# Patient Record
Sex: FEMALE | Race: WHITE | Employment: OTHER | URBAN - METROPOLITAN AREA
[De-identification: names, ages, dates, MRNs, and addresses within clinical notes are randomized per-mention and may not be internally consistent; named-entity substitution may affect disease eponyms.]

---
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
## 2017-09-11 ENCOUNTER — OFFICE VISIT (OUTPATIENT)
Dept: NEUROLOGY | Facility: CLINIC | Age: 82
End: 2017-09-11
Payer: COMMERCIAL

## 2017-09-11 VITALS
OXYGEN SATURATION: 95 % | TEMPERATURE: 97.1 F | BODY MASS INDEX: 21.46 KG/M2 | WEIGHT: 109.9 LBS | SYSTOLIC BLOOD PRESSURE: 115 MMHG | DIASTOLIC BLOOD PRESSURE: 71 MMHG

## 2017-09-11 DIAGNOSIS — G20.A1 PARKINSONS DISEASE (H): Primary | ICD-10-CM

## 2017-09-11 DIAGNOSIS — G20.A1 PARKINSON DISEASE (H): ICD-10-CM

## 2017-09-11 DIAGNOSIS — G56.03 BILATERAL CARPAL TUNNEL SYNDROME: ICD-10-CM

## 2017-09-11 PROBLEM — S62.102D FRACTURE OF LEFT WRIST WITH ROUTINE HEALING: Status: ACTIVE | Noted: 2017-05-02

## 2017-09-11 PROBLEM — S61.402A OPEN WOUND OF LEFT HAND: Status: ACTIVE | Noted: 2017-05-02

## 2017-09-11 PROBLEM — Z78.9 IMPAIRED MOBILITY AND ADLS: Status: ACTIVE | Noted: 2017-05-02

## 2017-09-11 PROBLEM — S70.02XA HEMATOMA OF LEFT HIP: Status: ACTIVE | Noted: 2017-05-02

## 2017-09-11 PROBLEM — Z74.09 IMPAIRED MOBILITY AND ADLS: Status: ACTIVE | Noted: 2017-05-02

## 2017-09-11 PROBLEM — D62 ACUTE BLOOD LOSS ANEMIA: Status: ACTIVE | Noted: 2017-05-02

## 2017-09-11 PROBLEM — R13.10 DYSPHAGIA: Status: ACTIVE | Noted: 2017-05-02

## 2017-09-11 PROBLEM — M81.0 AGE-RELATED OSTEOPOROSIS WITHOUT CURRENT PATHOLOGICAL FRACTURE: Status: ACTIVE | Noted: 2017-05-16

## 2017-09-11 PROCEDURE — 99214 OFFICE O/P EST MOD 30 MIN: CPT | Performed by: PSYCHIATRY & NEUROLOGY

## 2017-09-11 RX ORDER — CARBIDOPA/LEVODOPA 25MG-250MG
TABLET ORAL
Qty: 630 TABLET | Refills: 3 | Status: SHIPPED | OUTPATIENT
Start: 2017-09-11 | End: 2018-09-21

## 2017-09-11 RX ORDER — MAGNESIUM CARB/ALUMINUM HYDROX 105-160MG
1 TABLET,CHEWABLE ORAL DAILY PRN
COMMUNITY
Start: 2017-05-02

## 2017-09-11 RX ORDER — SENNOSIDES A AND B 8.6 MG/1
TABLET, FILM COATED ORAL
Qty: 120 TABLET | COMMUNITY
Start: 2017-09-11 | End: 2018-03-12

## 2017-09-11 RX ORDER — POLYETHYLENE GLYCOL 3350 17 G/17G
17 POWDER, FOR SOLUTION ORAL
Qty: 119 G | COMMUNITY
Start: 2017-09-11 | End: 2018-10-08

## 2017-09-11 RX ORDER — GABAPENTIN 100 MG/1
CAPSULE ORAL
Qty: 450 CAPSULE | Refills: 3 | Status: SHIPPED | OUTPATIENT
Start: 2017-09-11 | End: 2018-09-12

## 2017-09-11 RX ORDER — BISACODYL 10 MG
SUPPOSITORY, RECTAL RECTAL
COMMUNITY
Start: 2014-02-21

## 2017-09-11 RX ORDER — LACTOSE-REDUCED FOOD
1 LIQUID (ML) ORAL DAILY
COMMUNITY
Start: 2017-09-11

## 2017-09-11 RX ORDER — LACTOSE-REDUCED FOOD
LIQUID (ML) ORAL
COMMUNITY
Start: 2017-05-16 | End: 2018-03-12

## 2017-09-11 RX ORDER — POLYETHYLENE GLYCOL 3350 17 G/17G
17 POWDER, FOR SOLUTION ORAL
COMMUNITY
Start: 2017-08-28 | End: 2017-09-11

## 2017-09-11 RX ORDER — ACETAMINOPHEN 500 MG
TABLET ORAL
COMMUNITY
Start: 2017-05-09 | End: 2020-05-26

## 2017-09-11 RX ORDER — CITALOPRAM HYDROBROMIDE 20 MG/1
20 TABLET ORAL DAILY
Qty: 90 TABLET | Refills: 3 | Status: SHIPPED | OUTPATIENT
Start: 2017-09-11 | End: 2018-10-08

## 2017-09-11 RX ORDER — CARBIDOPA AND LEVODOPA 50; 200 MG/1; MG/1
TABLET, EXTENDED RELEASE ORAL
Qty: 90 TABLET | Refills: 11 | Status: SHIPPED | OUTPATIENT
Start: 2017-09-11 | End: 2018-09-26

## 2017-09-11 ASSESSMENT — PAIN SCALES - GENERAL: PAINLEVEL: NO PAIN (0)

## 2017-09-11 NOTE — MR AVS SNAPSHOT
After Visit Summary   2017    Avril Ram    MRN: [id number]           Patient Information     Date Of Birth          1925        Visit Information        Provider Department      2017 8:30 AM Kyle Valadez MD [facility]        Today's Diagnoses     Parkinsons disease (H)    -  1    Parkinson disease (H)        Bilateral carpal tunnel syndrome          Care Instructions    Diagnosis/Summary/Recommendations:    PATIENT: Avril Ram  89 year old female     : 1925    BINH: 2017    Parkinson    Issues addressed by PCP  1. Chronic bilateral low back pain, with sciatica presence unspecified (HRC)   2. Parkinson disease (HRC)   3. History of recent fall   4. Age-related osteoporosis without current pathological fracture (HRC)   5. Constipation, unspecified constipation type   6. Loss of weight   7. Urinary tract infection, site unspecified   8. Numbness of fingers of both hands     She states that she has had burning of her left middle finger - she has not had an emg.  She does not know if it wakes her up but it is worse in the morning. She has not had a wrist splint.    She has headaches    She has occasional hallucinations.    calcium  norco  Sinemet CR  Sinemet 25/250  Gabapentin  Citalopram  Diclofenac cream  xalatan  Senokot  voltaren gel  lorazepem  omeprzole  Albuterol  Ondansetron  Timolol  fluticasone    Over 50% of this visit was spent in patient care and care coordination.     History obtained from patient    Total visit time was 25 minutes    HAND Symptoms  1. Consider EMG  2. Consider wrist splint and OT evaluation    JAW symptoms  Consider videotaping with possible botox injection for jaw tremor vs jaw dyskinesia    Voice symptoms  Needs to work on her exercises  May consider harmonica or using an exhalation/incentive spirometry to strengthen her voice      Kyle Valadez MD           Follow-ups after your visit        Additional  Services     OCCUPATIONAL THERAPY REFERRAL                 Follow-up notes from your care team     Return in about 6 months (around 3/11/2018).      Your next 10 appointments already scheduled     Mar 12, 2018 11:00 AM CDT   Return Visit with Kyle Valadez MD   [facility] ([facility]    [id number] [place] [id number] [phone]              Future tests that were ordered for you today     Open Future Orders        Priority Expected Expires Ordered    EMG Routine  9/11/2018 9/11/2017            Who to contact     If you have questions or need follow up information about today's clinic visit or your schedule please contact [facility] directly at [phone].  Normal or non-critical lab and imaging results will be communicated to you by Loyalishart, letter or phone within 4 business days after the clinic has received the results. If you do not hear from us within 7 days, please contact the clinic through WorldWingert or phone. If you have a critical or abnormal lab result, we will notify you by phone as soon as possible.  Submit refill requests through AVIcode or call your pharmacy and they will forward the refill request to us. Please allow 3 business days for your refill to be completed.          Additional Information About Your Visit        AVIcode Information     AVIcode gives you secure access to your electronic health record. If you see a primary care provider, you can also send messages to your care team and make appointments. If you have questions, please call your primary care clinic.  If you do not have a primary care provider, please call [phone] and they will assist you.      AVIcode is an electronic gateway that provides easy, online access to your medical records. With AVIcode, you can request a clinic appointment, read your test results, renew a prescription or communicate with your care team.     To access your existing  account, please contact your [facility] or call [phone] for assistance.        Care EveryWhere ID     This is your Care EveryWhere ID. This could be used by other organizations to access your [place] medical records  [id number]        Your Vitals Were     Temperature Pulse Oximetry BMI (Body Mass Index)             97.1  F (36.2  C) (Oral) 95% 21.46 kg/m2          Blood Pressure from Last 3 Encounters:   09/11/17 115/71   11/14/16 160/71   05/09/16 116/69    Weight from Last 3 Encounters:   09/11/17 49.9 kg (109 lb 14.4 oz)   11/14/16 48.1 kg (106 lb)   05/09/16 44.5 kg (98 lb 3.2 oz)              We Performed the Following     OCCUPATIONAL THERAPY REFERRAL     WRIST SPLINT          Today's Medication Changes          These changes are accurate as of: 9/11/17  9:13 AM.  If you have any questions, ask your nurse or doctor.               These medicines have changed or have updated prescriptions.        Dose/Directions    polyethylene glycol powder   Commonly known as:  MIRALAX/GLYCOLAX   This may have changed:  when to take this   Changed by:  Kyle Valadez MD        Dose:  17 g   Take 17 g by mouth every other day   Quantity:  119 g   Refills:  0       senna 8.6 MG tablet   Commonly known as:  SENOKOT   This may have changed:  additional instructions   Changed by:  Kyle Valadez MD        Not taking as it makes her loose.   Quantity:  120 tablet   Refills:  0            Where to get your medicines      These medications were sent to [address]  [address] [facility] [address]     Phone:  [phone]     carbidopa-levodopa  MG per tablet    carbidopa-levodopa  MG per CR tablet    citalopram 20 MG tablet    gabapentin 100 MG capsule                Primary Care Provider Office Phone # Fax #    Shawna Lopez [phone]       [facility] [facility] [address]  [place] [address]        Equal Access to Services     [facility]: Hadii solo ku sylvia Romero, waaxda luqadaha, qaybta kaalmada maryharmanaidee, waxmoe farzad gracerudydagoberto benson. So [facility] [phone].    ATENCIÓN: Si habla español, tiene a tong disposición servicios gratuitos de asistencia lingüística. Fredame al [phone].    We comply with applicable federal civil rights laws and [place] laws. We do not discriminate on the basis of race, color, national origin, age, disability sex, sexual orientation or gender identity.            Thank you!     Thank you for choosing [facility]  for your care. Our goal is always to provide you with excellent care. Hearing back from our patients is one way we can continue to improve our services. Please take a few minutes to complete the written survey that you may receive in the mail after your visit with us. Thank you!             Your Updated Medication List - Protect others around you: Learn how to safely use, store and throw away your medicines at www.disposemymeds.org.          This list is accurate as of: 9/11/17  9:13 AM.  Always use your most recent med list.                   Brand Name Dispense Instructions for use Diagnosis    acetaminophen 500 MG tablet    TYLENOL     1000 mg tid and 500 mg bid prn. Maximum acetaminophen dose is 4000 mg in 24 hours  Indications: Pain        CALCIUM-VITAMIN D PO      Take 1 capsule by mouth 2 times daily        * carbidopa-levodopa  MG per tablet    SINEMET    630 tablet    1 tablet at 6a, 830a, 11a, 130pm, 4pm and 630pm   == 6-7 tablets per day    Parkinsons disease (H)       * carbidopa-levodopa  MG per CR tablet    SINEMET CR    90 tablet    Take one at  930pm    Parkinson disease (H)       citalopram 20 MG tablet    celeXA    90 tablet    Take 1 tablet (20 mg) by mouth daily    Parkinsons disease (H)       * ENSURE Liqd      Or equivalent nutritional supplement for weight loss daily        * BOOST       Take 1 Bottle by mouth daily        FLOVENT DISKUS 100 MCG/BLIST Aepb   Generic drug:  fluticasone      Inhale 1 puff into the lungs every 12 hours        gabapentin 100 MG capsule    NEURONTIN    450 capsule    1 tab @ 8am, 1 tab @ 4pm and 3 tabs @ 8pm   == 5 per day    Parkinsons disease (H)       LAXATIVE 10 MG Suppository   Generic drug:  bisacodyl      Unwrap and insert 1 Suppository rectally once a day as needed for Constipation.        LORazepam 0.5 MG tablet    ATIVAN    30 tablet    1 tablet at bedtime for primary insomnia    Primary insomnia       mineral oil oil      Place 1 drop in ear(s)        NONFORMULARY      Prune juice every other day        omeprazole 20 MG tablet     180 tablet    1 tab twice daily. And may see gi specialist.    Gastroesophageal reflux disease without esophagitis       polyethylene glycol powder    MIRALAX/GLYCOLAX    119 g    Take 17 g by mouth every other day        senna 8.6 MG tablet    SENOKOT    120 tablet    Not taking as it makes her loose.        timolol hemihydrate 0.5 % Soln ophthalmic solution    BETIMOL     Place 1 drop into both eyes 2 times daily.        XALATAN 0.005 % ophthalmic solution   Generic drug:  latanoprost     1 Bottle    Place 1 drop into both eyes At Bedtime        ZOFRAN 4 MG tablet   Generic drug:  ondansetron      Take  by mouth every 8 hours as needed.        * Notice:  This list has 4 medication(s) that are the same as other medications prescribed for you. Read the directions carefully, and ask your doctor or other care provider to review them with you.

## 2017-09-11 NOTE — NURSING NOTE
Avril Ram's goals for this visit include: recheck  She requests these members of her care team be copied on today's visit information:     PCP: Nayely Arana    Referring Provider:  ESTABLISHED PATIENT  No address on file    Chief Complaint   Patient presents with     RECHECK       Initial /71  Temp 97.1  F (36.2  C) (Oral)  Wt 49.9 kg (109 lb 14.4 oz)  SpO2 95%  BMI 21.46 kg/m2 Estimated body mass index is 21.46 kg/(m^2) as calculated from the following:    Height as of 11/4/13: 1.524 m (5').    Weight as of this encounter: 49.9 kg (109 lb 14.4 oz).  Medication Reconciliation: complete
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
## 2017-09-11 NOTE — PATIENT INSTRUCTIONS
Diagnosis/Summary/Recommendations:    PATIENT: Avril Ram  89 year old female     : 1925    BINH: 2017    Parkinson    Issues addressed by PCP  1. Chronic bilateral low back pain, with sciatica presence unspecified (HRC)   2. Parkinson disease (HRC)   3. History of recent fall   4. Age-related osteoporosis without current pathological fracture (HRC)   5. Constipation, unspecified constipation type   6. Loss of weight   7. Urinary tract infection, site unspecified   8. Numbness of fingers of both hands     She states that she has had burning of her left middle finger - she has not had an emg.  She does not know if it wakes her up but it is worse in the morning. She has not had a wrist splint.    She has headaches    She has occasional hallucinations.    calcium  norco  Sinemet CR  Sinemet 25/250  Gabapentin  Citalopram  Diclofenac cream  xalatan  Senokot  voltaren gel  lorazepem  omeprzole  Albuterol  Ondansetron  Timolol  fluticasone    Over 50% of this visit was spent in patient care and care coordination.     History obtained from patient    Total visit time was 25 minutes    HAND Symptoms  1. Consider EMG  2. Consider wrist splint and OT evaluation    JAW symptoms  Consider videotaping with possible botox injection for jaw tremor vs jaw dyskinesia    Voice symptoms  Needs to work on her exercises  May consider harmonica or using an exhalation/incentive spirometry to strengthen her voice      Kyle Valadez MD

## 2017-09-25 ENCOUNTER — TELEPHONE (OUTPATIENT)
Dept: NEUROLOGY | Facility: CLINIC | Age: 82
End: 2017-09-25

## 2017-09-25 NOTE — TELEPHONE ENCOUNTER
[facility] CLINICAL DOCUMENTATION    Form Documentation Form or Letter Request    Type or form/letter needing completion: Speech therapy  Provider: Rory  Has provider seen patient for office visit related to reason for form request? Yes  Date form needed: ASAP  Once completed: Fax form to: [phone]     This was completed and confirmation recvd    Darla Severin-Brown, LPN

## 2018-02-20 ENCOUNTER — TELEPHONE (OUTPATIENT)
Dept: NEUROLOGY | Facility: CLINIC | Age: 83
End: 2018-02-20

## 2018-02-20 NOTE — LETTER
" To: Imelda Colbert      In regards to your inquiry- see the information below. We tried to call the cell phone number listed but it stated \"the number you have dialed is not in service\". Feel free to give us a call at the clinic [phone].      At this time there is no brain bank for Parkinson's Disease at the [facility]. We have cooperated with both [place] and [place] in the past, and have a [place] alum recently join the [employer] who is interested in establishing a program here in the future.  Here are three resources to explore:     1) [facility]: http://www.[place].[facility]/research/labs/parkinsons-disease-clinical-genetics/brain-donor-program-information     https://www.brainsupportnetwork.org/brain-donation/  (a larger network that works with [facility])       2) Brain Donation Program [place]: [email or web address].[place].[facility]/participate-in-research/brain-donation-program/       3) [facility]:   The body bequest program can be found at : [email or web address]://www.med.[facility].edu/research/anatomy-bequest-program/what-whole-body-donation         Lorena Lira, RN Care Coordinator  [facility] Neurology  [phone]    "

## 2018-02-20 NOTE — TELEPHONE ENCOUNTER
"Attempted to call Imelda back multiple times but number listed stated \"number you have dialed is not in service\"; emailed Imelda information and mailed letter to pt's listed address addressed to Imelda Colbert.   "

## 2018-02-20 NOTE — TELEPHONE ENCOUNTER
[facility] Call Center    Phone Message    May a detailed message be left on voicemail: yes    Reason for Call: Other: Patients daughter is calling requesting to speak with clinic regarding the patient wanting to donate her brain to parkinsons research when the patient passes away. Patient and daughter were directed to speak with Dr. Valadez to see if he knows of any place where they could donate. Patient only wants to donate brain for research. Please advise.       Action Taken: Message routed to:  Adult Clinics: Neurology p [phone]

## 2018-02-20 NOTE — TELEPHONE ENCOUNTER
Jocelyn Amaral, SANDRA   You; Kyle Valadez MD; Torie Morrison RN 15 minutes ago (9:12 AM)                 Jayce Carrillo,     This is the message I put together after inquiring about this a few weeks ago:     At this time there is no brain bank for Parkinson's Disease at the [facility]. We have cooperated with both [place] and [place] in the past, and have a [place] alum recently join the [employer]  who is interested in establishing a program here in the future.  Here are three resources to explore:     1) [facility]: http://www.[place].[facility]/research/labs/parkinsons-disease-clinical-genetics/brain-donor-program-information     https://www.brainsupportnetwork.org/brain-donation/  (a larger network that works with [facility])       2) Brain Donation Program [place]: https://udallcenter.[place].[facility]/participate-in-research/brain-donation-program/       3) [facility]:   The body bequest program can be found at : https://www.med.[facility].edu/research/anatomy-bequest-program/what-whole-body-donation     Jocelyn Eisenberg  (Routing comment)
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
## 2018-03-12 ENCOUNTER — OFFICE VISIT (OUTPATIENT)
Dept: NEUROLOGY | Facility: CLINIC | Age: 83
End: 2018-03-12
Payer: COMMERCIAL

## 2018-03-12 VITALS
SYSTOLIC BLOOD PRESSURE: 125 MMHG | WEIGHT: 112.4 LBS | BODY MASS INDEX: 21.95 KG/M2 | OXYGEN SATURATION: 96 % | DIASTOLIC BLOOD PRESSURE: 70 MMHG | HEART RATE: 74 BPM

## 2018-03-12 DIAGNOSIS — G24.4 OROFACIAL DYSKINESIA: ICD-10-CM

## 2018-03-12 DIAGNOSIS — G20.A1 PARKINSONS DISEASE (H): Primary | ICD-10-CM

## 2018-03-12 PROCEDURE — 99214 OFFICE O/P EST MOD 30 MIN: CPT | Performed by: PSYCHIATRY & NEUROLOGY

## 2018-03-12 ASSESSMENT — PAIN SCALES - GENERAL: PAINLEVEL: NO PAIN (0)

## 2018-03-12 NOTE — NURSING NOTE
Avril Ram's goals for this visit include: return  She requests these members of her care team be copied on today's visit information:     PCP: Shawna Lopez    Referring Provider:  No referring provider defined for this encounter.    Chief Complaint   Patient presents with     RECHECK       Initial /70  Pulse 74  Wt 51 kg (112 lb 6.4 oz)  SpO2 96%  BMI 21.95 kg/m2 Estimated body mass index is 21.95 kg/(m^2) as calculated from the following:    Height as of 11/4/13: 1.524 m (5').    Weight as of this encounter: 51 kg (112 lb 6.4 oz).  Medication Reconciliation: complete    Do you need any medication refills at today's visit? y

## 2018-03-12 NOTE — Clinical Note
3/12/2018         RE: Avril Ram  [address] [facility] [address]        Dear Colleague,    Thank you for referring your patient, Avril Ram, to the [facility]. Please see a copy of my visit note below.    No notes on file    Again, thank you for allowing me to participate in the care of your patient.        Sincerely,        Kyle Valadez MD

## 2018-03-12 NOTE — PATIENT INSTRUCTIONS
PATIENT: Avril Ram  89 year old female     : 1925    BINH: 2018    Parkinson  She has increased jaw tremor at night.   She has anxiety that drives that.       HAND Symptoms discussed last time  1. Consider EMG  2. Consider wrist splint and OT evaluation  She did not have concerns about this.      JAW symptoms discussed last time  Consider videotaping with possible botox injection for jaw tremor vs jaw dyskinesia     Voice symptoms  Needs to work on her exercises  May consider harmonica or using an exhalation/incentive spirometry to strengthen her voice    She has blepharospasm and gets injections from [facility] - not clear if he would do the jaw injections for orofacial dyskinesias.   She could see Dr. Bach.   She probably will not go for this. Order placed  Consideration for relaxation issues.     [facility]  Address: [address] [place] [address]  Phone: [phone]        Medications     6am 830am 11am 130p 4pm 630pm 8pm 930pm   Acetaminophen tylenol       1-2 ?    Bisacodyl laxative As needed          Calcium vitamin d  1     1    Carbidopa/levodopa sinemet CR 50/200        1   Carbidopa/levodopa sinemet 25/250 1 1 1 1 1 1     Citalopram celexa 20mg       1 tablet    Fluticasone flovent diskus inhaler  9am     730pm    Gabapentin neurontin 100mg  1 @ 8am   1  3    Latanoprols xalatan 0.005%       1 drop each eye @ 8pm    Lorazepam ativan Not taking          Mineral oil for ears As needed both ears          Nonformulary prune juice Every other day          Nutritional supplement boost daily          Nutritional supplement ensure Not taking          Omeprazole 20mg prilosec  1@8am   1      Ondansetron zofran 4mg           Polyethylene glycol miralax Every other day          Senna senokot Not taking          Timolol 0.5% solution eyes  1 drop each eye @ 8-9am     1 drop each eye @ 8pm                 History obtained from patient      Coding statement:    Duration of  Services: patient care and care coordination was 25 minutes  Greater than 50% of this visit was spent in counseling and coordination of care.    PLAN  Consideration for botox of the jaw with Dr. Bach  Hold off on the wrist issues as this is stable.  Reviewed medications and patient and family will need to review the medication table above.   Use relaxation and tylenol at night to help with anxiety, etc.  Return back in 6 months.      Kyle Valadez MD
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
## 2018-03-12 NOTE — MR AVS SNAPSHOT
After Visit Summary   3/12/2018    Avril Ram    MRN: [id number]           Patient Information     Date Of Birth          1925        Visit Information        Provider Department      3/12/2018 11:00 AM Kyle Valadez MD [facility]        Today's Diagnoses     Parkinsons disease (H)    -  1    Orofacial dyskinesia          Care Instructions    PATIENT: Avril Ram  89 year old female     : 1925    BINH: 2018    Parkinson  She has increased jaw tremor at night.   She has anxiety that drives that.       HAND Symptoms discussed last time  1. Consider EMG  2. Consider wrist splint and OT evaluation  She did not have concerns about this.      JAW symptoms discussed last time  Consider videotaping with possible botox injection for jaw tremor vs jaw dyskinesia     Voice symptoms  Needs to work on her exercises  May consider harmonica or using an exhalation/incentive spirometry to strengthen her voice    She has blepharospasm and gets injections from [facility] - not clear if he would do the jaw injections for orofacial dyskinesias.   She could see Dr. Bach.   She probably will not go for this. Order placed  Consideration for relaxation issues.     [facility]  Address: [address] [place] [address]  Phone: [phone]        Medications     6am 830am 11am 130p 4pm 630pm 8pm 930pm   Acetaminophen tylenol       1-2 ?    Bisacodyl laxative As needed          Calcium vitamin d  1     1    Carbidopa/levodopa sinemet CR 50/200        1   Carbidopa/levodopa sinemet 25/250 1 1 1 1 1 1     Citalopram celexa 20mg       1 tablet    Fluticasone flovent diskus inhaler  9am     730pm    Gabapentin neurontin 100mg  1 @ 8am   1  3    Latanoprols xalatan 0.005%       1 drop each eye @ 8pm    Lorazepam ativan Not taking          Mineral oil for ears As needed both ears          Nonformulary prune juice Every other day          Nutritional  supplement boost daily          Nutritional supplement ensure Not taking          Omeprazole 20mg prilosec  1@8am   1      Ondansetron zofran 4mg           Polyethylene glycol miralax Every other day          Jamison stoll Not taking          Timolol 0.5% solution eyes  1 drop each eye @ 8-9am     1 drop each eye @ 8pm                 History obtained from patient      Coding statement:   Duration of  Services: patient care and care coordination was 25 minutes  Greater than 50% of this visit was spent in counseling and coordination of care.    PLAN  Consideration for botox of the jaw with Dr. Bach  Hold off on the wrist issues as this is stable.  Reviewed medications and patient and family will need to review the medication table above.   Use relaxation and tylenol at night to help with anxiety, etc.  Return back in 6 months.      Kyle Valadez MD           Follow-ups after your visit        Additional Services     NEUROLOGY ADULT REFERRAL       Your provider has referred you for the following: Dr. Bach botox for orofacial dsykinesias.  Please be aware that coverage of these services is subject to the terms and limitations of your health insurance plan.  Call member services at your health plan with any benefit or coverage questions.      Please bring the following with you to your appointment:    (1) Any X-Rays, CTs or MRIs which have been performed.  Contact the facility where they were done to arrange for  prior to your scheduled appointment.    (2) List of current medications  (3) This referral request   (4) Any documents/labs given to you for this referral                  Follow-up notes from your care team     Return in about 6 months (around 9/12/2018).      Your next 10 appointments already scheduled     Sep 10, 2018 11:00 AM CDT   Return Visit with Kyle Valadez MD   [facility] ([facility]    [id number] [place] [id number]    [phone]              Future tests that were ordered for you today     Open Future Orders        Priority Expected Expires Ordered    NEUROLOGY ADULT REFERRAL Routine  3/12/2019 3/12/2018            Who to contact     If you have questions or need follow up information about today's clinic visit or your schedule please contact [facility] directly at [phone].  Normal or non-critical lab and imaging results will be communicated to you by MyChart, letter or phone within 4 business days after the clinic has received the results. If you do not hear from us within 7 days, please contact the clinic through [employer] or phone. If you have a critical or abnormal lab result, we will notify you by phone as soon as possible.  Submit refill requests through [employer] or call your pharmacy and they will forward the refill request to us. Please allow 3 business days for your refill to be completed.          Additional Information About Your Visit        [employer] Information     [employer] gives you secure access to your electronic health record. If you see a primary care provider, you can also send messages to your care team and make appointments. If you have questions, please call your primary care clinic.  If you do not have a primary care provider, please call [phone] and they will assist you.      [employer] is an electronic gateway that provides easy, online access to your medical records. With [employer], you can request a clinic appointment, read your test results, renew a prescription or communicate with your care team.     To access your existing account, please contact your [facility] or call [phone] for assistance.        Care EveryWhere ID     This is your Care EveryWhere ID. This could be used by other organizations to access your [place] medical records  [id number]        Your Vitals Were     Pulse Pulse Oximetry BMI (Body Mass Index)             74 96% 21.95  kg/m2          Blood Pressure from Last 3 Encounters:   03/12/18 125/70   09/11/17 115/71   11/14/16 160/71    Weight from Last 3 Encounters:   03/12/18 51 kg (112 lb 6.4 oz)   09/11/17 49.9 kg (109 lb 14.4 oz)   11/14/16 48.1 kg (106 lb)               Primary Care Provider Office Phone # Fax #    Shawna Lopez [phone]       [facility] [address] [facility] [address]  [facility] [address]        Equal Access to Services     [facility]: Hadii aad ku hadasho Soomaali, waaxda luqadaha, qaybta kaalmada adeegyada, rogelio villaseñor adekayden finn . So [facility] [phone].    ATENCIÓN: Si habla español, tiene a tong disposición servicios gratuitos de asistencia lingüística. [facility] [phone].    We comply with applicable federal civil rights laws and [place] laws. We do not discriminate on the basis of race, color, national origin, age, disability, sex, sexual orientation, or gender identity.            Thank you!     Thank you for choosing [facility]  for your care. Our goal is always to provide you with excellent care. Hearing back from our patients is one way we can continue to improve our services. Please take a few minutes to complete the written survey that you may receive in the mail after your visit with us. Thank you!             Your Updated Medication List - Protect others around you: Learn how to safely use, store and throw away your medicines at www.disposemymeds.org.          This list is accurate as of 3/12/18 11:36 AM.  Always use your most recent med list.                   Brand Name Dispense Instructions for use Diagnosis    acetaminophen 500 MG tablet    TYLENOL     1000 mg tid and 500 mg bid prn. Maximum acetaminophen dose is 4000 mg in 24 hours  Indications: Pain        BOOST      Take 1 Bottle by mouth daily        CALCIUM-VITAMIN D PO      Take 1 capsule by mouth 2 times daily        * carbidopa-levodopa  MG per tablet     SINEMET    630 tablet    1 tablet at 6a, 830a, 11a, 130pm, 4pm and 630pm   == 6-7 tablets per day    Parkinsons disease (H)       * carbidopa-levodopa  MG per CR tablet    SINEMET CR    90 tablet    Take one at  930pm    Parkinson disease (H)       citalopram 20 MG tablet    celeXA    90 tablet    Take 1 tablet (20 mg) by mouth daily    Parkinsons disease (H)       FLOVENT DISKUS 100 MCG/BLIST Aepb   Generic drug:  fluticasone      Inhale 1 puff into the lungs every 12 hours        gabapentin 100 MG capsule    NEURONTIN    450 capsule    1 tab @ 8am, 1 tab @ 4pm and 3 tabs @ 8pm   == 5 per day    Parkinsons disease (H)       LAXATIVE 10 MG Suppository   Generic drug:  bisacodyl      Unwrap and insert 1 Suppository rectally once a day as needed for Constipation.        mineral oil oil      Place 1 drop in ear(s)        NONFORMULARY      Prune juice every other day        omeprazole 20 MG tablet     180 tablet    1 tab twice daily. And may see gi specialist.    Gastroesophageal reflux disease without esophagitis       polyethylene glycol powder    MIRALAX/GLYCOLAX    119 g    Take 17 g by mouth every other day        timolol hemihydrate 0.5 % Soln ophthalmic solution    BETIMOL     Place 1 drop into both eyes 2 times daily.        XALATAN 0.005 % ophthalmic solution   Generic drug:  latanoprost     1 Bottle    Place 1 drop into both eyes At Bedtime        ZOFRAN 4 MG tablet   Generic drug:  ondansetron      Take  by mouth every 8 hours as needed.        * Notice:  This list has 2 medication(s) that are the same as other medications prescribed for you. Read the directions carefully, and ask your doctor or other care provider to review them with you.
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
## 2018-09-10 ENCOUNTER — OFFICE VISIT (OUTPATIENT)
Dept: NEUROLOGY | Facility: CLINIC | Age: 83
End: 2018-09-10
Payer: COMMERCIAL

## 2018-09-10 DIAGNOSIS — G20.A1 PARKINSONS DISEASE (H): Primary | ICD-10-CM

## 2018-09-10 DIAGNOSIS — G24.4 OROFACIAL DYSKINESIA: ICD-10-CM

## 2018-09-10 PROCEDURE — 99207 ZZC NO SHOW FOR SCHEDULED APPT: CPT | Performed by: PSYCHIATRY & NEUROLOGY

## 2018-09-10 RX ORDER — NYSTATIN 100000 [USP'U]/G
POWDER TOPICAL PRN
COMMUNITY
Start: 2018-06-06

## 2018-09-10 RX ORDER — DORZOLAMIDE HYDROCHLORIDE AND TIMOLOL MALEATE 20; 5 MG/ML; MG/ML
1 SOLUTION/ DROPS OPHTHALMIC 2 TIMES DAILY
COMMUNITY
Start: 2018-08-09 | End: 2018-10-08
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
## 2018-09-10 NOTE — MR AVS SNAPSHOT
After Visit Summary   9/10/2018    Avril Ram    MRN: [id number]           Patient Information     Date Of Birth          2/21/1925        Visit Information        Provider Department      9/10/2018 11:00 AM Kyle Valadez MD [facility]        Today's Diagnoses     Parkinsons disease (H)    -  1    Orofacial dyskinesia           Follow-ups after your visit        Who to contact     If you have questions or need follow up information about today's clinic visit or your schedule please contact [facility] directly at [phone].  Normal or non-critical lab and imaging results will be communicated to you by [email or web address], letter or phone within 4 business days after the clinic has received the results. If you do not hear from us within 7 days, please contact the clinic through [email or web address] or phone. If you have a critical or abnormal lab result, we will notify you by phone as soon as possible.  Submit refill requests through dough or call your pharmacy and they will forward the refill request to us. Please allow 3 business days for your refill to be completed.          Additional Information About Your Visit        MyChart Information     dough gives you secure access to your electronic health record. If you see a primary care provider, you can also send messages to your care team and make appointments. If you have questions, please call your primary care clinic.  If you do not have a primary care provider, please call [phone] and they will assist you.      dough is an electronic gateway that provides easy, online access to your medical records. With dough, you can request a clinic appointment, read your test results, renew a prescription or communicate with your care team.     To access your existing account, please contact your [facility] or call [phone] for assistance.        Care EveryWhere ID     This is your Care  EveryWhere ID. This could be used by other organizations to access your [place] medical records  [id number]         Blood Pressure from Last 3 Encounters:   03/12/18 125/70   09/11/17 115/71   11/14/16 160/71    Weight from Last 3 Encounters:   03/12/18 51 kg (112 lb 6.4 oz)   09/11/17 49.9 kg (109 lb 14.4 oz)   11/14/16 48.1 kg (106 lb)              Today, you had the following     No orders found for display       Primary Care Provider Office Phone # Fax #    Shawna Lopez [phone]       [facility] [address]  [facility] [address]        Equal Access to Services     KIKI HEADLEY : Hadii solo yoon hadasho Soomaali, waaxda luqadaha, qaybta kaalmada adeegyada, waxay idiin haymika finn . So [facility] [phone].    ATENCIÓN: Si habla español, tiene a tong disposición servicios gratuitos de asistencia lingüística. Llame al [phone].    We comply with applicable federal civil rights laws and [place] laws. We do not discriminate on the basis of race, color, national origin, age, disability, sex, sexual orientation, or gender identity.            Thank you!     Thank you for choosing [facility]  for your care. Our goal is always to provide you with excellent care. Hearing back from our patients is one way we can continue to improve our services. Please take a few minutes to complete the written survey that you may receive in the mail after your visit with us. Thank you!             Your Updated Medication List - Protect others around you: Learn how to safely use, store and throw away your medicines at www.disposemymeds.org.          This list is accurate as of 9/10/18 11:14 AM.  Always use your most recent med list.                   Brand Name Dispense Instructions for use Diagnosis    acetaminophen 500 MG tablet    TYLENOL     1000 mg tid and 500 mg bid prn. Maximum acetaminophen dose is 4000 mg in 24 hours  Indications: Pain         BOOST      Take 1 Bottle by mouth daily        CALCIUM-VITAMIN D PO      Take 1 capsule by mouth 2 times daily        * carbidopa-levodopa  MG per tablet    SINEMET    630 tablet    1 tablet at 6a, 830a, 11a, 130pm, 4pm and 630pm   == 6-7 tablets per day    Parkinsons disease (H)       * carbidopa-levodopa  MG per CR tablet    SINEMET CR    90 tablet    Take one at  930pm    Parkinson disease (H)       citalopram 20 MG tablet    celeXA    90 tablet    Take 1 tablet (20 mg) by mouth daily    Parkinsons disease (H)       dorzolamide-timolol 2-0.5 % ophthalmic solution    COSOPT          FLOVENT DISKUS 100 MCG/BLIST Aepb   Generic drug:  fluticasone      Inhale 1 puff into the lungs every 12 hours        gabapentin 100 MG capsule    NEURONTIN    450 capsule    1 tab @ 8am, 1 tab @ 4pm and 3 tabs @ 8pm   == 5 per day    Parkinsons disease (H)       LAXATIVE 10 MG Suppository   Generic drug:  bisacodyl      Unwrap and insert 1 Suppository rectally once a day as needed for Constipation.        mineral oil oil      Place 1 drop in ear(s)        NONFORMULARY      Prune juice every other day        NYSTOP 419276 UNIT/GM Powd   Generic drug:  nystatin           omeprazole 20 MG tablet     180 tablet    1 tab twice daily. And may see gi specialist.    Gastroesophageal reflux disease without esophagitis       polyethylene glycol powder    MIRALAX/GLYCOLAX    119 g    Take 17 g by mouth every other day        timolol hemihydrate 0.5 % Soln ophthalmic solution    BETIMOL     Place 1 drop into both eyes 2 times daily.        XALATAN 0.005 % ophthalmic solution   Generic drug:  latanoprost     1 Bottle    Place 1 drop into both eyes At Bedtime        ZOFRAN 4 MG tablet   Generic drug:  ondansetron      Take  by mouth every 8 hours as needed.        * Notice:  This list has 2 medication(s) that are the same as other medications prescribed for you. Read the directions carefully, and ask your doctor or other care provider  to review them with you.

## 2018-09-12 DIAGNOSIS — G20.A1 PARKINSONS DISEASE (H): ICD-10-CM

## 2018-09-13 RX ORDER — GABAPENTIN 100 MG/1
CAPSULE ORAL
Qty: 150 CAPSULE | Refills: 3 | Status: SHIPPED | OUTPATIENT
Start: 2018-09-13 | End: 2018-10-08

## 2018-09-21 DIAGNOSIS — G20.A1 PARKINSONS DISEASE (H): ICD-10-CM

## 2018-09-24 RX ORDER — CARBIDOPA/LEVODOPA 25MG-250MG
TABLET ORAL
Qty: 210 TABLET | Refills: 3 | Status: SHIPPED | OUTPATIENT
Start: 2018-09-24 | End: 2018-10-08

## 2018-09-26 DIAGNOSIS — G20.A1 PARKINSON DISEASE (H): ICD-10-CM

## 2018-09-28 RX ORDER — CARBIDOPA AND LEVODOPA 50; 200 MG/1; MG/1
TABLET, EXTENDED RELEASE ORAL
Qty: 30 TABLET | Refills: 11 | Status: SHIPPED | OUTPATIENT
Start: 2018-09-28 | End: 2019-04-08
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
## 2018-10-08 ENCOUNTER — OFFICE VISIT (OUTPATIENT)
Dept: NEUROLOGY | Facility: CLINIC | Age: 83
End: 2018-10-08
Payer: COMMERCIAL

## 2018-10-08 VITALS
WEIGHT: 105.1 LBS | OXYGEN SATURATION: 96 % | SYSTOLIC BLOOD PRESSURE: 142 MMHG | BODY MASS INDEX: 20.53 KG/M2 | HEART RATE: 77 BPM | DIASTOLIC BLOOD PRESSURE: 77 MMHG

## 2018-10-08 DIAGNOSIS — G20.A1 PARKINSONS DISEASE (H): ICD-10-CM

## 2018-10-08 DIAGNOSIS — G20.A1 PARKINSON DISEASE (H): Primary | ICD-10-CM

## 2018-10-08 PROBLEM — R29.6 FALLS: Status: ACTIVE | Noted: 2017-04-01
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
## 2018-10-08 PROBLEM — S62.102A CLOSED FRACTURE OF LEFT WRIST: Status: ACTIVE | Noted: 2017-04-01

## 2018-10-08 PROCEDURE — 99214 OFFICE O/P EST MOD 30 MIN: CPT | Performed by: PSYCHIATRY & NEUROLOGY

## 2018-10-08 RX ORDER — CITALOPRAM HYDROBROMIDE 20 MG/1
TABLET ORAL
Qty: 135 TABLET | Refills: 3 | Status: SHIPPED | OUTPATIENT
Start: 2018-10-08 | End: 2019-04-08

## 2018-10-08 RX ORDER — POLYETHYLENE GLYCOL 3350 17 G/17G
17 POWDER, FOR SOLUTION ORAL DAILY PRN
Qty: 119 G | COMMUNITY
Start: 2018-10-08

## 2018-10-08 RX ORDER — CITALOPRAM HYDROBROMIDE 20 MG/1
TABLET ORAL
Qty: 90 TABLET | Refills: 3 | COMMUNITY
Start: 2018-10-08 | End: 2018-10-08

## 2018-10-08 RX ORDER — GABAPENTIN 100 MG/1
CAPSULE ORAL
Qty: 450 CAPSULE | Refills: 3 | Status: SHIPPED | OUTPATIENT
Start: 2018-10-08 | End: 2019-04-08

## 2018-10-08 RX ORDER — DORZOLAMIDE HYDROCHLORIDE AND TIMOLOL MALEATE 20; 5 MG/ML; MG/ML
1 SOLUTION/ DROPS OPHTHALMIC 2 TIMES DAILY
Qty: 1 BOTTLE | COMMUNITY
Start: 2018-10-08

## 2018-10-08 RX ORDER — CITALOPRAM HYDROBROMIDE 20 MG/1
TABLET ORAL
Qty: 135 TABLET | Refills: 3 | Status: SHIPPED | OUTPATIENT
Start: 2018-10-08 | End: 2018-10-08

## 2018-10-08 RX ORDER — CARBIDOPA/LEVODOPA 25MG-250MG
TABLET ORAL
Qty: 630 TABLET | Refills: 3 | Status: SHIPPED | OUTPATIENT
Start: 2018-10-08 | End: 2019-04-08

## 2018-10-08 ASSESSMENT — PAIN SCALES - GENERAL: PAINLEVEL: NO PAIN (0)
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
## 2018-10-08 NOTE — NURSING NOTE
Avril Ram's goals for this visit include: RETURN  She requests these members of her care team be copied on today's visit information:     PCP: Shawna Lopez    Referring Provider:  No referring provider defined for this encounter.    /77  Pulse 77  Wt 47.7 kg (105 lb 1.6 oz)  SpO2 96%  BMI 20.53 kg/m2    Do you need any medication refills at today's visit? N

## 2018-10-08 NOTE — MR AVS SNAPSHOT
After Visit Summary   10/8/2018    Avril Ram    MRN: [id number]           Patient Information     Date Of Birth          2/21/1925        Visit Information        Provider Department      10/8/2018 2:00 PM Kyle Valadez MD [facility]        Today's Diagnoses     Parkinson disease (H)    -  1    Parkinsons disease (H)           Follow-ups after your visit        Follow-up notes from your care team     Return in about 6 months (around 4/8/2019).      Your next 10 appointments already scheduled     Apr 15, 2019 11:00 AM CDT   Return Visit with Kyle Valadez MD   [facility] ([facility])    [address] [place] [id number] [phone]              Who to contact     If you have questions or need follow up information about today's clinic visit or your schedule please contact [facility] directly at [phone].  Normal or non-critical lab and imaging results will be communicated to you by wiserihart, letter or phone within 4 business days after the clinic has received the results. If you do not hear from us within 7 days, please contact the clinic through [employer] or phone. If you have a critical or abnormal lab result, we will notify you by phone as soon as possible.  Submit refill requests through [employer] or call your pharmacy and they will forward the refill request to us. Please allow 3 business days for your refill to be completed.          Additional Information About Your Visit        wiserihart Information     [employer] gives you secure access to your electronic health record. If you see a primary care provider, you can also send messages to your care team and make appointments. If you have questions, please call your primary care clinic.  If you do not have a primary care provider, please call [phone] and they will assist you.      [employer] is an electronic gateway that provides easy, online access  to your medical records. With [employer], you can request a clinic appointment, read your test results, renew a prescription or communicate with your care team.     To access your existing account, please contact your [facility] or call [phone] for assistance.        Care EveryWhere ID     This is your Care EveryWhere ID. This could be used by other organizations to access your [facility] medical records  [id number]        Your Vitals Were     Pulse Pulse Oximetry BMI (Body Mass Index)             77 96% 20.53 kg/m2          Blood Pressure from Last 3 Encounters:   10/08/18 142/77   03/12/18 125/70   09/11/17 115/71    Weight from Last 3 Encounters:   10/08/18 47.7 kg (105 lb 1.6 oz)   03/12/18 51 kg (112 lb 6.4 oz)   09/11/17 49.9 kg (109 lb 14.4 oz)              We Performed the Following     EKG 12-lead complete w/read - Clinics          Today's Medication Changes          These changes are accurate as of 10/8/18  2:42 PM.  If you have any questions, ask your nurse or doctor.               Start taking these medicines.        Dose/Directions    citalopram 20 MG tablet   Commonly known as:  celeXA   Used for:  Parkinsons disease (H)   Started by:  Kyle Valadez MD        Increase to 1.5 x 20MG TAB BY MOUTH nightly @ 8pm   Quantity:  135 tablet   Refills:  3         These medicines have changed or have updated prescriptions.        Dose/Directions    dorzolamide-timolol 2-0.5 % ophthalmic solution   Commonly known as:  COSOPT   This may have changed:    - how much to take  - how to take this  - when to take this  - additional instructions   Changed by:  Kyle Valadez MD        NOT SURE IF USING THIS COMBINATION PRODUCT 1 DROP IN BOTH EYES TWICE DAILY. STATES TAKING ONLY THE TIMOLOL WITHOUT DORZOLAMIDE as bottle has YELLOW CAP and not black cap   Quantity:  1 Bottle   Refills:  0            Where to get your medicines      These medications were sent to [facility]  [facility] [place], [place] - [address] [facility]  [address] [facility] [address]     Phone:  [phone]     carbidopa-levodopa  MG per tablet    gabapentin 100 MG capsule         Some of these will need a paper prescription and others can be bought over the counter.  Ask your nurse if you have questions.     Bring a paper prescription for each of these medications     citalopram 20 MG tablet                Primary Care Provider Office Phone # Fax #    Shawna Lopez [phone]       [address] [facility] [address]  [facility] [address]        Equal Access to Services     [facility]: Hadii aad ku hadasho Sopaul, waaxda luqadaha, qaybta kaalmada adeegyada, rogelio finn . So [facility] [phone].    ATENCIÓN: Si habla español, tiene a tong disposición servicios gratuitos de asistencia lingüística. [facility] [phone].    We comply with applicable federal civil rights laws and [place] laws. We do not discriminate on the basis of race, color, national origin, age, disability, sex, sexual orientation, or gender identity.            Thank you!     Thank you for choosing [facility]  for your care. Our goal is always to provide you with excellent care. Hearing back from our patients is one way we can continue to improve our services. Please take a few minutes to complete the written survey that you may receive in the mail after your visit with us. Thank you!             Your Updated Medication List - Protect others around you: Learn how to safely use, store and throw away your medicines at www.disposemymeds.org.          This list is accurate as of 10/8/18  2:42 PM.  Always use your most recent med list.                   Brand Name Dispense Instructions for use Diagnosis    acetaminophen 500 MG tablet    TYLENOL     1000 mg tid and 500 mg bid prn. Maximum acetaminophen dose is 4000 mg in 24 hours  Indications: Pain         BOOST      Take 1 Bottle by mouth daily        CALCIUM-VITAMIN D PO      Take 1 capsule by mouth 2 times daily        * carbidopa-levodopa  MG per CR tablet    SINEMET CR    30 tablet    Take one at  930pm    Parkinson disease (H)       * carbidopa-levodopa  MG per tablet    SINEMET    630 tablet    1 tablet at 6a, 830a, 11a, 130pm, 4pm and 630pm   == 6-7 tablets per day    Parkinsons disease (H)       citalopram 20 MG tablet    celeXA    135 tablet    Increase to 1.5 x 20MG TAB BY MOUTH nightly @ 8pm    Parkinsons disease (H)       dorzolamide-timolol 2-0.5 % ophthalmic solution    COSOPT    1 Bottle    NOT SURE IF USING THIS COMBINATION PRODUCT 1 DROP IN BOTH EYES TWICE DAILY. STATES TAKING ONLY THE TIMOLOL WITHOUT DORZOLAMIDE as bottle has YELLOW CAP and not black cap        FLOVENT DISKUS 100 MCG/BLIST Aepb   Generic drug:  fluticasone      Inhale 1 puff into the lungs every 12 hours        gabapentin 100 MG capsule    NEURONTIN    450 capsule    1 tab @ 8am, 1 tab @ 4pm and 3 tabs @ 8pm   == 5 per day    Parkinsons disease (H)       LAXATIVE 10 MG Suppository   Generic drug:  bisacodyl      Unwrap and insert 1 Suppository rectally once a day as needed for Constipation.        mineral oil oil      Place 1 drop in ear(s)        NONFORMULARY      Prune juice every other day        NYSTOP [id number] UNIT/GM Powd   Generic drug:  nystatin      as needed        omeprazole 20 MG tablet     180 tablet    1 tab twice daily. And may see gi specialist.    Gastroesophageal reflux disease without esophagitis       polyethylene glycol powder    MIRALAX/GLYCOLAX    119 g    Take 17 g by mouth every 48 hours as needed        timolol hemihydrate 0.5 % Soln ophthalmic solution    BETIMOL     Place 1 drop into both eyes 2 times daily.        XALATAN 0.005 % ophthalmic solution   Generic drug:  latanoprost     1 Bottle    Place 1 drop into both eyes At Bedtime        ZOFRAN 4 MG tablet   Generic drug:  ondansetron       Take  by mouth every 8 hours as needed.        * Notice:  This list has 2 medication(s) that are the same as other medications prescribed for you. Read the directions carefully, and ask your doctor or other care provider to review them with you.
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
## 2019-03-05 ENCOUNTER — TELEPHONE (OUTPATIENT)
Dept: NEUROLOGY | Facility: CLINIC | Age: 84
End: 2019-03-05

## 2019-03-05 NOTE — TELEPHONE ENCOUNTER
"Spoke to the pts daughter. She states that for the past 2 weeks her mother has had complaints of \"her jaw moving fast\". She also reports that it feels like there is swelling on the left side of her jaw and that there is a \"heartbeat\" in her jaw when the mouth movements are bad. .   Her daughter did not see any visible swelling when she was there today. There is no typical time of the day that is worse.   She is due to see Dr Valadez in April and offered a sooner appt but the daughter has work constraints.   Dr Valadez mentioned in his office visit note on 3/12/18 that she has a jaw tremor and that she could see Dr Bach for possible botox. This has been discussed with her daughter and she will try to make arrangements for the consult and procedure with Dr Bach.  In the meantime, she would like to know what her mother can do for pain. Routed to Dr Valadez to review and advise.   Virgie Avian RN    "

## 2019-03-05 NOTE — TELEPHONE ENCOUNTER
[facility] Call Center    Phone Message    May a detailed message be left on voicemail: no    Reason for Call: Symptoms or Concerns     If patient has red-flag symptoms, warm transfer to triage line    Current symptom or concern: Pt reports problems with her jaw.  Swelling on the left side like there is a pulse on this side.  Moves real fast.     Symptoms have been present for:  2 week(s)    Has patient previously been seen for this? No    Are there any new or worsening symptoms? Yes: Happening more often. Pt and daughter are asking for a call back this morning as daughter is at moms home right now.     Action Taken: Message routed to:  [facility]: Neurology p [id number]

## 2019-03-06 NOTE — TELEPHONE ENCOUNTER
The pt's daughter was informed of Dr Valadez's recommendations. She will call back to set up the appt with Dr Bach as soon as she can.  Virgie Avina RN

## 2019-03-06 NOTE — TELEPHONE ENCOUNTER
[facility] Call Center    Phone Message    May a detailed message be left on voicemail: yes    Reason for Call: Other: Dr Lopez (Avril's PCP) saw her in clinic today and states that Avril is really bothered by this pain.  She is uploading the clinic notes from today's appointment into [facility] for Dr Valadez to review.  Please call either Dr Lopez or Avril with any suggestions or direction on what to do next     Action Taken: Message routed to:  [facility] ([facility]):  Neurology
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
## 2019-04-08 ENCOUNTER — OFFICE VISIT (OUTPATIENT)
Dept: NEUROLOGY | Facility: CLINIC | Age: 84
End: 2019-04-08
Payer: COMMERCIAL

## 2019-04-08 VITALS
HEART RATE: 72 BPM | SYSTOLIC BLOOD PRESSURE: 146 MMHG | DIASTOLIC BLOOD PRESSURE: 68 MMHG | OXYGEN SATURATION: 95 % | WEIGHT: 102.7 LBS | BODY MASS INDEX: 20.06 KG/M2

## 2019-04-08 DIAGNOSIS — G20.A1 PARKINSONS DISEASE (H): ICD-10-CM

## 2019-04-08 DIAGNOSIS — R68.84 JAW PAIN: ICD-10-CM

## 2019-04-08 DIAGNOSIS — G24.4 OROFACIAL DYSKINESIA: ICD-10-CM

## 2019-04-08 DIAGNOSIS — G20.A1 PARKINSON DISEASE (H): Primary | ICD-10-CM

## 2019-04-08 DIAGNOSIS — G24.9 DYSTONIC MOVEMENTS: ICD-10-CM

## 2019-04-08 PROCEDURE — 99214 OFFICE O/P EST MOD 30 MIN: CPT | Performed by: PSYCHIATRY & NEUROLOGY

## 2019-04-08 RX ORDER — CARBIDOPA/LEVODOPA 25MG-250MG
TABLET ORAL
Qty: 630 TABLET | Refills: 3 | Status: SHIPPED | OUTPATIENT
Start: 2019-04-08 | End: 2019-10-21

## 2019-04-08 RX ORDER — CITALOPRAM HYDROBROMIDE 20 MG/1
TABLET ORAL
Qty: 135 TABLET | Refills: 3 | Status: SHIPPED | OUTPATIENT
Start: 2019-04-08 | End: 2019-11-25

## 2019-04-08 RX ORDER — CARBIDOPA AND LEVODOPA 50; 200 MG/1; MG/1
TABLET, EXTENDED RELEASE ORAL
Qty: 90 TABLET | Refills: 11 | Status: SHIPPED | OUTPATIENT
Start: 2019-04-08 | End: 2019-10-21

## 2019-04-08 RX ORDER — GABAPENTIN 100 MG/1
CAPSULE ORAL
Qty: 450 CAPSULE | Refills: 3 | Status: SHIPPED | OUTPATIENT
Start: 2019-04-08 | End: 2019-10-21

## 2019-04-08 ASSESSMENT — PAIN SCALES - GENERAL: PAINLEVEL: NO PAIN (0)
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
## 2019-04-08 NOTE — NURSING NOTE
Avril Ram's goals for this visit include: return  She requests these members of her care team be copied on today's visit information:     PCP: Shawna Lopez    Referring Provider:  Shawna Lopez MD  [facility] [address] [place] [address]    /68   Pulse 72   Wt 46.6 kg (102 lb 11.2 oz)   SpO2 95%   BMI 20.06 kg/m      Do you need any medication refills at today's visit? n

## 2019-04-09 ENCOUNTER — TELEPHONE (OUTPATIENT)
Dept: NEUROLOGY | Facility: CLINIC | Age: 84
End: 2019-04-09

## 2019-04-09 NOTE — TELEPHONE ENCOUNTER
[facility] Call Center    Phone Message    May a detailed message be left on voicemail: no    Reason for Call: Other: Pt and daughter have questions regarding orders that were given .  Need add'l information.      Action Taken: Message routed to:  Adult Clinics: Neurology p [id number]

## 2019-04-09 NOTE — TELEPHONE ENCOUNTER
I left message for patient to call me back or I will attempt to call her back later this afternoon.    Jayda Guillen LPN

## 2019-04-11 NOTE — TELEPHONE ENCOUNTER
Imelda called back to inform clinic that she had looked into getting patient scheduled for the oral facial pain/dental clinic though the clinic informed her that they do not accept Medical Assistance Insurance Programs. She also stated that Dr. Valadez had mentioned that Avril could take 1 of 3 different approaches. Under Imelda's assumption, Imelda thought the oral facial pain/dental clinic was the first and maybe ENT could have been the second option, though she would like to confirm this with Dr. Valadez.    Will route to Dr. Valadez to confirm if ENT referral would be the next step given inability to bee seen in oral facial pain/denal clinic due to [place]. If so which ENT clinic did he advise.     Informed Imelda that clinic would be back in touch with her after learning Dr. Valadez's response.     Steff Anne RN

## 2019-04-12 NOTE — TELEPHONE ENCOUNTER
I called Imelda and let her know that Dr. Valadez recommends she see Dr. Black in ENT-either at  or at the [place].    Jayda Guillen LPN

## 2019-04-12 NOTE — TELEPHONE ENCOUNTER
Kyle Valadez MD Robinson, Amy J, RN 22 hours ago (9:44 AM)      ENT - could see Dr. Black at  or in [place]    Routing comment

## 2019-04-17 ENCOUNTER — OFFICE VISIT (OUTPATIENT)
Dept: OTOLARYNGOLOGY | Facility: CLINIC | Age: 84
End: 2019-04-17
Attending: PSYCHIATRY & NEUROLOGY
Payer: COMMERCIAL

## 2019-04-17 VITALS — HEART RATE: 66 BPM | SYSTOLIC BLOOD PRESSURE: 137 MMHG | OXYGEN SATURATION: 95 % | DIASTOLIC BLOOD PRESSURE: 65 MMHG

## 2019-04-17 DIAGNOSIS — M26.609 TEMPOROMANDIBULAR JOINT DISORDER: ICD-10-CM

## 2019-04-17 PROCEDURE — 99203 OFFICE O/P NEW LOW 30 MIN: CPT | Performed by: OTOLARYNGOLOGY

## 2019-04-17 NOTE — PATIENT INSTRUCTIONS
TMJ Clinic [phone]    [email or web address]
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
## 2019-05-23 ENCOUNTER — TRANSFERRED RECORDS (OUTPATIENT)
Dept: HEALTH INFORMATION MANAGEMENT | Facility: CLINIC | Age: 84
End: 2019-05-23

## 2019-10-09 PROBLEM — R29.6 FALLS FREQUENTLY: Status: ACTIVE | Noted: 2019-04-09

## 2019-10-09 RX ORDER — ZINC OXIDE 216 MG/ML
3 LOTION TOPICAL
COMMUNITY
Start: 2019-04-10

## 2019-10-09 RX ORDER — PYRITHIONE ZINC 1 G/100ML
LOTION/SHAMPOO TOPICAL
COMMUNITY
Start: 2019-04-10

## 2019-10-09 RX ORDER — DIAPER,BRIEF,ADULT, DISPOSABLE
EACH MISCELLANEOUS
COMMUNITY
Start: 2019-04-10
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
## 2019-10-21 ENCOUNTER — OFFICE VISIT (OUTPATIENT)
Dept: NEUROLOGY | Facility: CLINIC | Age: 84
End: 2019-10-21
Payer: COMMERCIAL

## 2019-10-21 VITALS
HEIGHT: 60 IN | SYSTOLIC BLOOD PRESSURE: 165 MMHG | OXYGEN SATURATION: 95 % | BODY MASS INDEX: 19.71 KG/M2 | DIASTOLIC BLOOD PRESSURE: 81 MMHG | HEART RATE: 68 BPM | WEIGHT: 100.4 LBS

## 2019-10-21 DIAGNOSIS — G20.A1 PARKINSON DISEASE (H): Primary | ICD-10-CM

## 2019-10-21 DIAGNOSIS — G20.A1 PARKINSONS DISEASE (H): ICD-10-CM

## 2019-10-21 PROBLEM — E44.0 MODERATE MALNUTRITION (H): Status: ACTIVE | Noted: 2019-07-01

## 2019-10-21 PROBLEM — M54.50 LOW BACK PAIN: Status: ACTIVE | Noted: 2019-06-30

## 2019-10-21 PROBLEM — M26.609 TMJ DYSFUNCTION: Status: ACTIVE | Noted: 2019-05-29

## 2019-10-21 PROCEDURE — 99214 OFFICE O/P EST MOD 30 MIN: CPT | Performed by: PSYCHIATRY & NEUROLOGY

## 2019-10-21 RX ORDER — LIDOCAINE 4 G/G
1 PATCH TOPICAL EVERY 12 HOURS PRN
COMMUNITY

## 2019-10-21 RX ORDER — AMOXICILLIN 250 MG
1 CAPSULE ORAL
COMMUNITY

## 2019-10-21 RX ORDER — GABAPENTIN 100 MG/1
CAPSULE ORAL
Qty: 450 CAPSULE | Refills: 3 | Status: SHIPPED | OUTPATIENT
Start: 2019-10-21

## 2019-10-21 RX ORDER — SIMETHICONE 80 MG
80 TABLET,CHEWABLE ORAL EVERY 6 HOURS PRN
COMMUNITY

## 2019-10-21 RX ORDER — CARBIDOPA/LEVODOPA 25MG-250MG
TABLET ORAL
Qty: 630 TABLET | Refills: 3 | Status: SHIPPED | OUTPATIENT
Start: 2019-10-21 | End: 2020-05-26

## 2019-10-21 RX ORDER — CARBIDOPA AND LEVODOPA 50; 200 MG/1; MG/1
TABLET, EXTENDED RELEASE ORAL
Qty: 90 TABLET | Refills: 11 | Status: SHIPPED | OUTPATIENT
Start: 2019-10-21 | End: 2020-05-26

## 2019-10-21 ASSESSMENT — PAIN SCALES - GENERAL: PAINLEVEL: NO PAIN (0)

## 2019-10-21 ASSESSMENT — MIFFLIN-ST. JEOR: SCORE: 776.91
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
## 2019-10-21 NOTE — NURSING NOTE
Avril Ram's goals for this visit include: return  She requests these members of her care team be copied on today's visit information:     PCP: Shawna Lopez    Referring Provider:  Referred Self, MD  No address on file    BP (!) 165/81   Pulse 68   Ht 1.524 m (5')   Wt 45.5 kg (100 lb 6.4 oz)   SpO2 95%   BMI 19.61 kg/m      Do you need any medication refills at today's visit? n

## 2019-10-21 NOTE — PATIENT INSTRUCTIONS
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     She is living in [facility] in [place]    Her daughter Lorena was not able to bring her today.     We discussed left jaw pain - she had been seen by Dentistry per her report and nothing was found that was concerning.     She is eating on her left side and has lost teeth on the left side.    She has good bowel management    She has not had hallucinations recently    She is not very active at [facility]    She remains in her chair.       Medication table not updated below   Updated the chart listing of her medications.        Medications     6am 830am 11am 130p 4pm 630pm 8pm 930pm   Acetaminophen tylenol 500mg                  1-2 ?      Bisacodyl laxative As needed                        Calcium vitamin d    1             1      Carbidopa/levodopa sinemet CR 50/200                      1   Carbidopa/levodopa sinemet 25/250 1 1 1 1 1 1         Citalopram celexa 20mg                   1.5      Dorzolamide-timolol cosopt 2-0.5% opthalmic soliation  1         1     Fluticasone flovent diskus inhaler    9am             730pm      Gabapentin neurontin 100mg    1 @ 8am       1    3      Latanoprost xalatan 0.005%                   1 drop each eye @ 8pm      Mineral oil for ears As needed both ears                        Nonformulary prune juice Every other day                        Nutritional supplement boost daily                        Nutritional supplement ensure Not taking                        Omeprazole 20mg prilosec    1@8am       1            Ondansetron zofran 4mg                           Polyethylene glycol miralax NOT USING  Every other day                        Senna senokot Not  taking                        Timolol 0.5% solution eyes    Using combo drug - see above                                           History obtained from patient    Plan  No change in her medication regimen  Her bowels seem to be fine  She will come back in 6 months.

## 2019-11-12 DIAGNOSIS — G20.A1 PARKINSONS DISEASE (H): ICD-10-CM

## 2019-11-12 NOTE — TELEPHONE ENCOUNTER
Health Call Center    Phone Message    May a detailed message be left on voicemail: yes    Reason for Call: Medication Refill Request    Has the patient contacted the pharmacy for the refill? Yes   Name of medication being requested: citalopram (CELEXA) 20 MG tablet [[id number]] (Order [id number])   October 8 increased to 30mg and needs new order sent to [facility]   Provider who prescribed the medication: Dr. Valadez   Pharmacy: [facility]  Date medication is needed: ASAP    Pt's nurse, Penny, is looking just for the increase in medication sent to the pharmacy.       Action Taken: Message routed to:  Adult Clinics: Neurology p [id number]

## 2019-11-12 NOTE — TELEPHONE ENCOUNTER
Left message for Penny to let her know a year's supply of the medication was sent in April 2019. Spoke with pharmacy who reports they have a more recent order from May 2019 from pt's PCP to take 1 tablet daily. I let them know they will need to reach out to pt's PCP to clarify order and re-order this mediation as they were the last provider who made adjustments on the pt's dose. Lorena Dexter RN

## 2019-11-25 RX ORDER — CITALOPRAM HYDROBROMIDE 20 MG/1
TABLET ORAL
Qty: 135 TABLET | Refills: 3 | Status: SHIPPED | OUTPATIENT
Start: 2019-11-25 | End: 2020-09-28

## 2019-11-25 NOTE — TELEPHONE ENCOUNTER
Received a call from Penny at [facility], who states PCP Dr. Lopez said to follow orders from Neurology for pt's Celexa. They are requesting new orders be sent over to the pharmacy. Will queue up a new refill and have it sent over. Lorena Dexter RN

## 2019-12-04 ENCOUNTER — TELEPHONE (OUTPATIENT)
Dept: NEUROLOGY | Facility: CLINIC | Age: 84
End: 2019-12-04

## 2019-12-04 NOTE — TELEPHONE ENCOUNTER
[facility] Call Center    Phone Message    May a detailed message be left on voicemail: yes    Reason for Call: Other: patient's PCP, Dr. Shawna Lopez called for Dr. Valadez. She stated Dr. Valadez had mentioned using botox for her tremor, but she couldn't tell from his note what is the next step. Is the patient suppose to set up something? Patient is interested in getting the botox. Dr. Lopez mentioned that the patient already gets botox for her eyes. Please call Kiesha Zhang back at Dr. Lopez's office- she will take the message for Dr. Lopez. She can be reached at [phone].     Action Taken: Message routed to:  Adult Clinics: Neurology p [id number]

## 2019-12-04 NOTE — TELEPHONE ENCOUNTER
Please let pt's PCP know that pt would need to have a Botox consult with either ENT or Neurology for tremor. Dr. Valadez can enter referral if this is what they want to pursue. Lorena Dexter RN

## 2019-12-09 NOTE — TELEPHONE ENCOUNTER
Left message for Kiseha Zhang with the information below. She can call us back with questions. Lorena Dexter RN

## 2020-04-13 ENCOUNTER — TELEPHONE (OUTPATIENT)
Dept: NEUROLOGY | Facility: CLINIC | Age: 85
End: 2020-04-13

## 2020-04-13 NOTE — TELEPHONE ENCOUNTER
I called pt daughter-Imelda and left message to call back and change appt to video or phone appt.    Jayda Guillen LPN

## 2020-04-21 ENCOUNTER — TELEPHONE (OUTPATIENT)
Dept: NEUROLOGY | Facility: CLINIC | Age: 85
End: 2020-04-21

## 2020-04-21 NOTE — TELEPHONE ENCOUNTER
We were going to try and set up another video call with patient since yesterdays call did not go well.  I will forward the information below to Dr. Valadez to see what he recommends.    Jayda Guillen LPN

## 2020-04-21 NOTE — TELEPHONE ENCOUNTER
[facility] Call Center    Phone Message    May a detailed message be left on voicemail: yes     Reason for Call: Patient's daughter Lorena wanting to know if there is any questions that Dr. Valadez need answered for a visit that she can try to relay to her mom to get answered. Patient has a hard time hearing on the phone and doesn't have access to do a video visit. Please advise. Thank you.    Action Taken: Message routed to:  Adult Clinics: Neurology p [id number]    Travel Screening: Not Applicable

## 2020-04-21 NOTE — TELEPHONE ENCOUNTER
I called and left message for Imelda to call us back  and schedule Follow-up-see message below.    Jayda Guillen LPN

## 2020-04-21 NOTE — TELEPHONE ENCOUNTER
Per Dr. Valadez:    She will need some type of return visit within the year of last visit   It can be a phone call if need be but preferably a video visit using smartphone, ipad, desktop, laptop, etc.   So if they want to wait till summer they can reach out to us
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
## 2020-05-26 ENCOUNTER — MEDICAL CORRESPONDENCE (OUTPATIENT)
Dept: HEALTH INFORMATION MANAGEMENT | Facility: CLINIC | Age: 85
End: 2020-05-26

## 2020-05-26 ENCOUNTER — VIRTUAL VISIT (OUTPATIENT)
Dept: NEUROLOGY | Facility: CLINIC | Age: 85
End: 2020-05-26
Payer: COMMERCIAL

## 2020-05-26 DIAGNOSIS — G20.A1 PARKINSONS DISEASE (H): ICD-10-CM

## 2020-05-26 DIAGNOSIS — G20.A1 PARKINSON DISEASE (H): Primary | ICD-10-CM

## 2020-05-26 PROBLEM — L89.152 PRESSURE ULCER OF SACRAL REGION, STAGE 2 (H): Status: ACTIVE | Noted: 2020-05-19

## 2020-05-26 RX ORDER — CARBIDOPA AND LEVODOPA 50; 200 MG/1; MG/1
TABLET, EXTENDED RELEASE ORAL
Qty: 90 TABLET | Refills: 11 | COMMUNITY
Start: 2020-05-26 | End: 2020-12-29

## 2020-05-26 RX ORDER — CARBIDOPA/LEVODOPA 25MG-250MG
TABLET ORAL
Qty: 630 TABLET | Refills: 3 | COMMUNITY
Start: 2020-05-26

## 2020-05-26 RX ORDER — ACETAMINOPHEN 500 MG
1000 TABLET ORAL 2 TIMES DAILY
COMMUNITY
Start: 2020-05-26

## 2020-05-26 NOTE — PROGRESS NOTES
"Avril Ram is a 89 year old female who is being evaluated via a billable telephone visit.      The patient has been notified of following:     \"This telephone visit will be conducted via a call between you and your physician/provider. We have found that certain health care needs can be provided without the need for a physical exam.  This service lets us provide the care you need with a short phone conversation.  If a prescription is necessary we can send it directly to your pharmacy.  If lab work is needed we can place an order for that and you can then stop by our lab to have the test done at a later time.    Telephone visits are billed at different rates depending on your insurance coverage. During this emergency period, for some insurers they may be billed the same as an in-person visit.  Please reach out to your insurance provider with any questions.    If during the course of the call the physician/provider feels a telephone visit is not appropriate, you will not be charged for this service.\"    Patient has given verbal consent for Telephone visit?  Yes    What phone number would you like to be contacted at? [phone]    How would you like to obtain your AVS? Mail a copy    Phone call duration: - see below. minutes      Hermann Daniel EMT        "

## 2020-05-26 NOTE — PATIENT INSTRUCTIONS
Assessment:  (G20) Parkinson disease (H)  (primary encounter diagnosis)  Visual hallucinations  She has off time when she wakes up but may be getting her 25/100 sinemet starting hours after she wakes up.    Virgie Matos John       Medications     6am 830am 11am 130p 4pm 630pm 8pm 930pm   Acetaminophen tylenol 500mg    2              2      Albuterol proair respiclick 108 90 base) mcg/act inhaler  as needed                 Bisacodyl laxative As needed                        Calcium vitamin d    1             1      Carbidopa/levodopa sinemet CR 50/200                      1   Carbidopa/levodopa sinemet 25/250 1 1 1 1 1 1         Cholecalciferol vitamin D 1000 units 1                 Citalopram celexa 20mg                   1.5      Diclofenac voltaren 1% topical gel                   Dorzolamide-timolol cosopt 2-0.5% opthalmic soliation   1         1     Fluticasone flovent diskus inhaler    9am             730pm      Gabapentin neurontin 100mg    1 @ 8am       1    3      Latanoprost xalatan 0.005%                   1 drop each eye @ 8pm      Lidocaine lidocare 4% patch aspercreme  as needed                 Mineral oil for ears As needed both ears Tues/day                        Nonformulary prune juice Every other day                        Nutritional supplement boost daily                        Nutritional supplement ensure Not taking                        Nystop powder 100,000 unit/gm           Nystatin cream           Omeprazole 20mg prilosec    1@8am       1            Ondansetron zofran 4mg Not taking                        Polyethylene glycol miralax As needed                         Senna-docusate senokot S 8.6-50              As needed         Simethicone mylicon 80mg chewable tablet  as needed                  Timolol maleate timpotic 0.5% ophthalmic solution   not taking                              Plan:   May consider the use of seroquel (quetiapine) 1/2 of 25mg at night  and could increase to 25mg at night for visual hallucinations if we agree that the hallucinations need to be treated. I am happy for write for these if need be.     Timing of her short acting 25/100 Sinemet may need to be changed. She is getting 1 tablet of sinemet  7 times per day and may want to start with the first dose at 7am so she is mobile soon after wakening.    Return to be determined.     Relayed verbal message to head of nursing and she will ensure that Dr. Lopez has heard it through her as well as in this note.
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
## 2020-05-29 ENCOUNTER — DOCUMENTATION ONLY (OUTPATIENT)
Dept: NEUROLOGY | Facility: CLINIC | Age: 85
End: 2020-05-29

## 2020-05-29 ENCOUNTER — TELEPHONE (OUTPATIENT)
Dept: NEUROLOGY | Facility: CLINIC | Age: 85
End: 2020-05-29

## 2020-05-29 NOTE — TELEPHONE ENCOUNTER
[facility] Call Center    Phone Message    May a detailed message be left on voicemail: yes     Reason for Call: Other: Pts daughter would like to discuss if any changes were made during video visit. Please call her to discuss.      Action Taken: Message routed to:  [facility] Neuro    Travel Screening: Not Applicable

## 2020-05-29 NOTE — PROGRESS NOTES
Received forms from, [facility], forms were emailed to provider, forms were sent back signed by provider, and fax to [phone], sent to be scanned

## 2020-08-21 NOTE — TELEPHONE ENCOUNTER
Kyle Valadez MD Diaz, Melissa A, RN Cc: Seferino Bach MD   Caller: Unspecified (Yesterday, 11:06 AM)             1. Use frozen peas in a towel for the facial pain or/and heating pad   2. Oral analgesics   3. Mouth guard   4. Videotape the movements to bring in for Dr. Bach on phone in case not present at time of the botox evaluation   5. I don't prescribe narcotics and given age there are risks of problems with most medications.   6. She could see if her pcp would or visit with dentist if dental pain.   7. We are going to have to figure our if it is tremor vs dyskinesia of the mouth in regards to parkinsonian medication changes based on the video but botox should help either.         LIBERTAD Puga aware and will contact pt

## 2020-09-25 DIAGNOSIS — G20.A1 PARKINSONS DISEASE (H): ICD-10-CM

## 2020-09-28 RX ORDER — CITALOPRAM HYDROBROMIDE 20 MG/1
TABLET ORAL
Qty: 135 TABLET | Refills: 3 | Status: SHIPPED | OUTPATIENT
Start: 2020-11-20

## 2020-09-28 NOTE — TELEPHONE ENCOUNTER
Rx Authorization:    Requested Medication/ Dose: Celexa 20    Date last refill ordered: 11/25/19    Quantity ordered: 135    # refills: 3    Date of last clinic visit with ordering provider: 5/26/20    Date of next clinic visit with ordering provider: 0    All pertinent protocol data (lab date/result):     Include pertinent information from patients message:

## 2020-09-28 NOTE — TELEPHONE ENCOUNTER
Will change the start date to 11/20/2020. Patient received a 90 day supply with 3 refills on 11/25/2019.

## 2020-12-29 DIAGNOSIS — G20.A1 PARKINSON DISEASE (H): ICD-10-CM

## 2020-12-29 NOTE — TELEPHONE ENCOUNTER
[facility] Call Center    Phone Message    May a detailed message be left on voicemail: yes     Reason for Call: Medication Refill Request    Has the patient contacted the pharmacy for the refill? Yes   Name of medication being requested: carbidopa-levodopa (SINEMET CR)  MG CR tablet [28249] (Order [id number])    Provider who prescribed the medication: Dr. Valadez   Pharmacy: [facility]   Date medication is needed: ASAP- Tonight or tomorrow for tomorrow night dose

## 2020-12-30 RX ORDER — CARBIDOPA AND LEVODOPA 50; 200 MG/1; MG/1
1 TABLET, EXTENDED RELEASE ORAL AT BEDTIME
Qty: 90 TABLET | Refills: 3 | Status: SHIPPED | OUTPATIENT
Start: 2020-12-30